# Patient Record
(demographics unavailable — no encounter records)

---

## 2017-02-05 NOTE — PDOC
History of Present Illness





- General


Stated Complaint: EAR PAIN


History Source: Patient


Exam Limitations: No Limitations





- History of Present Illness


Timing/Duration: 1-3 hours


Associated Symptoms: denies: cough, fever/chills, headaches, loss of appetite





Past History





- Travel


Traveled outside of the country in the last 30 days: No


Close contact w/someone who was outside of country & ill: No





- Past Medical History


Allergies/Adverse Reactions: 


 Allergies











Allergy/AdvReac Type Severity Reaction Status Date / Time


 


latex Allergy   Verified 02/05/17 06:28











Home Medications: 


Ambulatory Orders





Amox-Tr/K Cl [Augmentin - 875Mg Tablet] 1 tab PO BID #14 tablet 02/05/17 


Ondansetron [Zofran Odt -] 4 mg SL BID #10 od.tablet 02/05/17 











**Review of Systems





- Review of Systems


Able to Perform ROS?: Yes


Comments:: 





02/05/17 06:18


CONSTITUTIONAL: 


Absent: fever, chills, diaphoresis, generalized weakness, malaise, loss of 

appetite


HEENT: 


+left ear pain


Absent: rhinorrhea, nasal congestion, throat pain, throat swelling, difficulty 

swallowing, mouth swelling,  eye pain, visual Changes


CARDIOVASCULAR: 


Absent: chest pain, loss of consciousness, palpitations, irregular heart rate, 

peripheral edema


RESPIRATORY: 


Absent: cough, shortness of breath, dyspnea with exertion, orthopnea, wheezing, 

stridor, hemoptysis


GASTROINTESTINAL:


Absent: abdominal pain, abdominal distension, nausea, vomiting, diarrhea, 

constipation, melena, hematochezia


GENITOURINARY: 


Absent: dysuria, frequency, urgency, hesitancy, hematuria, flank pain, genital 

pain


MUSCULOSKELETAL: 


Absent: myalgia, arthralgia, joint swelling


SKIN: 


Absent: rash, itching, pallor





Is the patient limited English proficient: No





*Physical Exam





- Physical Exam


Comments: 





02/05/17 06:19


GENERAL:


Well developed, well nourished. Awake and alert. No acute distress.


HEENT:


left ear: TM/erythematous and bulging canal: Within normal limits/no stenosis/

no erythematous/ no pain on auricular movement 


Normocephalic, atraumatic. PERRLA, EOMI. No conjunctival pallor. Sclera are non-

icteric. Moist mucous membranes. Oropharynx is clear.


NECK: 


Supple. Full ROM. No JVD. Carotid pulses 2+ and symmetric, without bruits. No 

thyromegaly. No lymphadenopathy.


CARDIOVASCULAR:


Regular rate and rhythm. No murmurs, rubs, or gallops. Distal pulses are 2+ and 

symmetric. 


PULMONARY: 


No evidence of respiratory distress. Lungs clear to auscultation bilaterally. 

No wheezing, rales or rhonchi.


ABDOMINAL:


Soft. Non-tender. Non-distended. No rebound or guarding. No organomegaly. 

Normoactive bowel sounds. 


MUSCULOSKELETAL 


Normal range of motion at all joints. No bony deformities or tenderness. No CVA 

tenderness.


EXTREMITIES: 


No cyanosis. No clubbing. No edema. No calf tenderness.


SKIN: 


Warm and dry. Normal capillary refill. No rashes. No jaundice. 


NEUROLOGICAL: 


Alert, awake, appropriate. Cranial nerves 2-12 intact. No deficits to light 

touch and temperature in face, upper extremities and lower extremities. No 

motor deficits in the in face, upper extremities and lower extremities. 

Normoreflexic in the upper and lower extremities. Normal speech. Toes are down-

going bilaterally. Gait is normal without ataxia.


PSYCHIATRIC: 


Cooperative. Good eye contact. Appropriate mood and affect.





*DC/Admit/Observation/Transfer


Diagnosis at time of Disposition: 


Left otitis media


Qualifiers:


 Otitis media type: unspecified Chronicity: unspecified Qualified Code(s): 

H66.92 - Otitis media, unspecified, left ear





- Discharge Dispostion


Disposition: HOME


Condition at time of disposition: Fair





- Prescriptions


Prescriptions: 


Amox-Tr/K Cl [Augmentin - 875Mg Tablet] 1 tab PO BID #14 tablet


Ondansetron [Zofran Odt -] 4 mg SL BID #10 od.tablet





- Referrals


Referrals: 


Elie Adams MD [Staff Physician] - 





- Patient Instructions


Printed Discharge Instructions:  Middle Ear Infection


Additional Instructions: 


Tylenol/Motrin as needed for pain





Take the amoxicillin antibiotics





Return to the ER for severe/persistent/worsening symptoms





Progress Note





- Progress Note


Progress Note: 


29-year-old female presents to the emergency department complaining of left ear 

pain 1 day. Pain is described as 5/10 dull nonradiating constant discomfort 

without nausea/vomiting, fever/chills, difficulty hearing, sore throat, 

rhinorrhea, neck pains, chest pain, shortness of breath. Patient is currently 

on Tamiflu after being diagnosed for influenza A 4 days ago.